# Patient Record
Sex: FEMALE | Race: WHITE | Employment: OTHER | URBAN - METROPOLITAN AREA
[De-identification: names, ages, dates, MRNs, and addresses within clinical notes are randomized per-mention and may not be internally consistent; named-entity substitution may affect disease eponyms.]

---

## 2017-01-26 ENCOUNTER — OFFICE VISIT (OUTPATIENT)
Dept: CARDIOLOGY CLINIC | Age: 82
End: 2017-01-26

## 2017-01-26 VITALS
DIASTOLIC BLOOD PRESSURE: 74 MMHG | BODY MASS INDEX: 19.51 KG/M2 | WEIGHT: 106 LBS | SYSTOLIC BLOOD PRESSURE: 118 MMHG | HEIGHT: 62 IN | HEART RATE: 56 BPM

## 2017-01-26 DIAGNOSIS — I35.1 AORTIC VALVE INSUFFICIENCY, UNSPECIFIED ETIOLOGY: ICD-10-CM

## 2017-01-26 DIAGNOSIS — R00.0 RAPID HEART BEAT: Primary | ICD-10-CM

## 2017-01-26 DIAGNOSIS — I10 ESSENTIAL HYPERTENSION, BENIGN: ICD-10-CM

## 2017-01-26 RX ORDER — LISINOPRIL 2.5 MG/1
2.5 TABLET ORAL DAILY
Qty: 90 TAB | Refills: 3 | Status: SHIPPED | OUTPATIENT
Start: 2017-01-26 | End: 2018-05-24 | Stop reason: SDUPTHER

## 2017-01-26 RX ORDER — TIMOLOL MALEATE 2.5 MG/ML
1 SOLUTION/ DROPS OPHTHALMIC 2 TIMES DAILY
COMMUNITY

## 2017-01-26 RX ORDER — LISINOPRIL 5 MG/1
TABLET ORAL DAILY
COMMUNITY
End: 2017-01-26 | Stop reason: SDUPTHER

## 2017-01-26 RX ORDER — CYCLOSPORINE 0.5 MG/ML
1 EMULSION OPHTHALMIC 2 TIMES DAILY
COMMUNITY

## 2017-01-26 RX ORDER — GLUCOSAMINE SULFATE 1500 MG
POWDER IN PACKET (EA) ORAL DAILY
COMMUNITY

## 2017-01-26 NOTE — PROGRESS NOTES
Crow Baird MD. [facility] [place]              Patient: Christiane Case  : 1935      Today's Date: 2017            HISTORY OF PRESENT ILLNESS:     History of Present Illness:  Ms. Obi Espinal comes in with complaint of heart racing spell. She says about two weeks ago she woke up with a heart racing spell - she usually sleeps poorly - may have lasted a few minutes - never happened before and not since then. No there heart racing spells. She saw her PCP and referred here. She gets lightheaded only after putting drops in eye in evening - but not other times. She denies any CP or SOB. PAST MEDICAL HISTORY:     Past Medical History   Diagnosis Date    Acne rosacea     AI (aortic insufficiency)      mild    Arthritis     Chronic Insomnia     Colon polyps     Glaucoma suspect     HTN (hypertension)     MR (mitral regurgitation)      mild    MIGUEL (obstructive sleep apnea)     Osteopenia     Other ill-defined conditions(799.89)      bone spurs lower back and neck    PSVT (paroxysmal supraventricular tachycardia) (HCC)     Stroke ([facility] [address]      tia    TIA (transient ischaemic attack)      amaurosis fugax 20+ years ago    Uterine fibroids          Past Surgical History   Procedure Laterality Date    Hx tonsil and adenoidectomy      Endoscopy, colon, diagnostic      Vas carotid duplex bilateral  3/26/12     0-9% bilateral    Hx orthopaedic       closed reduction right leg    Hx heent       tonsillectomy    Hx gyn       fibroids removed    Hx appendectomy             MEDICATIONS:     Current Outpatient Prescriptions   Medication Sig Dispense Refill    lisinopril (PRINIVIL, ZESTRIL) 5 mg tablet Take  by mouth daily.  cholecalciferol (VITAMIN D3) 1,000 unit cap Take  by mouth daily.  bimatoprost (LUMIGAN) 0.01 % ophthalmic drops Administer 1 Drop to both eyes every evening.       timolol (TIMOPTIC) 0.25 % ophthalmic solution Administer 1 Drop to both eyes two (2) times a day.      cycloSPORINE (RESTASIS) 0.05 % ophthalmic emulsion Administer 1 Drop to both eyes two (2) times a day.  LORazepam (ATIVAN) 1 mg tablet Take 1 mg by mouth nightly.  biotin 2,500 mcg tab Take  by mouth daily.  HYDROcodone-acetaminophen (NORCO) 5-325 mg per tablet Take 1 Tab by mouth every four (4) hours as needed for Pain. Max Daily Amount: 6 Tabs. Do not drive within 6 hours of taking 20 Tab 0    ondansetron (ZOFRAN ODT) 4 mg disintegrating tablet Take 1 Tab by mouth every eight (8) hours as needed for Nausea. 10 Tab 0    aspirin 81 mg chewable tablet Take 81 mg by mouth daily.  magnesium 250 mg Tab Take 125 mg by mouth nightly.  CALCIUM CARBONATE (CALCIUM 600 PO) Take  by mouth nightly.  MULTIVITAMIN PO Take  by mouth daily. No Known Allergies          SOCIAL HISTORY:     Social History   Substance Use Topics    Smoking status: Never Smoker    Smokeless tobacco: Never Used    Alcohol use 3.5 oz/week     7 Glasses of wine per week           FAMILY HISTORY:     Family History   Problem Relation Age of Onset    Heart Disease Father     Heart Disease Paternal Uncle            REVIEW OF SYMPTOMS:     Review of Symptoms:  Constitutional: Negative for fever, chills  HEENT: Negative for nosebleeds, tinnitus, and vision changes. Respiratory: Negative for cough, wheezing  Cardiovascular: Negative for orthopnea, claudication, syncope, and PND. Gastrointestinal: Negative for abdominal pain, diarrhea, melena. Genitourinary: Negative for dysuria  Musculoskeletal: Negative for myalgias. Skin: Negative for rash  Heme: No problems bleeding.  + bruises easily   Neurological: Negative for speech change and focal weakness. PHYSICAL EXAM:     Physical Exam:  Visit Vitals    /74    Pulse (!) 56    Ht 5' 2\" (1.575 m)    Wt 106 lb (48.1 kg)    BMI 19.39 kg/m2     Patient appears generally well, mood and affect are appropriate and pleasant.   HEENT: Hearing intact, non-icteric, normocephalic, atraumatic. Neck Exam: Supple, No JVD or carotid bruits. Lung Exam: Clear to auscultation, even breath sounds. Cardiac Exam: Regular rate and rhythm with 1/6 systolic murmur  Abdomen: Soft, non-tender, normal bowel sounds. No bruits or masses. Extremities: Moves all ext well. No lower extremity edema. Vascular: 2+ dorsalis pedis pulses bilaterally. Psych: Appropriate affect  Neuro - Grossly intact          LABS / OTHER STUDIES:     Lab Results   Component Value Date/Time    Sodium 139 06/14/2015 10:32 AM    Potassium 4.3 06/14/2015 10:32 AM    Chloride 103 06/14/2015 10:32 AM    CO2 28 06/14/2015 10:32 AM    Anion gap 8 06/14/2015 10:32 AM    Glucose 115 06/14/2015 10:32 AM    BUN 17 06/14/2015 10:32 AM    Creatinine 0.69 06/14/2015 10:32 AM    BUN/Creatinine ratio 25 06/14/2015 10:32 AM    GFR est AA >60 06/14/2015 10:32 AM    GFR est non-AA >60 06/14/2015 10:32 AM    Calcium 8.7 06/14/2015 10:32 AM    Bilirubin, total 0.5 04/29/2013 08:48 AM    ALT 9 04/29/2013 08:48 AM    AST 19 04/29/2013 08:48 AM    Alk.  phosphatase 60 04/29/2013 08:48 AM    Protein, total 6.4 04/29/2013 08:48 AM    Albumin 4.3 04/29/2013 08:48 AM    Globulin 2.9 10/04/2010 09:04 AM    A-G Ratio 2.0 04/29/2013 08:48 AM       Lab Results   Component Value Date/Time    Cholesterol, total 202 04/29/2013 08:48 AM    HDL Cholesterol 87 04/29/2013 08:48 AM    LDL, calculated 101 04/29/2013 08:48 AM    VLDL, calculated 14 04/29/2013 08:48 AM    Triglyceride 68 04/29/2013 08:48 AM    CHOL/HDL Ratio 2.3 10/04/2010 09:04 AM       Lab Results   Component Value Date/Time    WBC 6.6 06/14/2015 10:32 AM    Hemoglobin (POC) 13.3 08/16/2013 10:11 AM    HGB 13.1 06/14/2015 10:32 AM    Hematocrit (POC) 39 08/16/2013 10:11 AM    HCT 39.2 06/14/2015 10:32 AM    PLATELET 938 59/50/8085 10:32 AM    MCV 99.5 06/14/2015 10:32 AM               CARDIAC DIAGNOSTICS:     Cardiac Evaluation Includes:  Echo 2/12 - LVEF 55%, mild-mod AI  Carotid Doppler 3/12 - 0-9% stenosis bilat   Echo 9/13 - LVEF 55-60%. Mild AI. Holter 9/13 - NSR, few PAC and PVC's          EKG 1/26/17 - sinus bradycardia, PRWP            ASSESSMENT AND PLAN:     Assessment and Plan:  1) Heart racing spell  - she had a single heart racing spell one night  - I discussed options and we decided to check a 30 day event monitor if she has any further heart racing spells  - Will check an echo   - Will get some recent labs to review     2) Mild AI  - recheck an echo     3) HTN  - will cut back lisinopril due to dizziness     4) History of remote TIA  - cont ASA   - her prior lipids looked great (HDL 87)    5) Phone FU after testing. See me back in one year. Patient expressed understanding of the plan - questions were answered. Annabelle Sigala MD, [address], [address] [place] [address]  Ph: [phone]   Ph [phone]        ADDENDUM   2/6/2017  Echo 2/6/17 - LVEF 55-60%. Grade 1 diastolic dysfunction. Mild MR and TR.  AV sclerosis with mild-mod AI. Echo shows stable findings. Will have my nurse call with stable results.

## 2017-01-26 NOTE — MR AVS SNAPSHOT
Visit Information Date & Time Provider Department Dept. Phone Encounter #  
 1/26/2017  9:00 AM Efrain Alba MD [facility] Priti Jackson [phone] [id number] Your Appointments 2/6/2017 11:00 AM  
ECHO CARDIOGRAMS 2D with CONCETTA CORTEZ  
[facility] ([facility]) Appt Note: per dr Pranay Tabor dx ai  
 [address] [facility] [address] [facility] [address]  
[phone]  
  
   
 [address] [place] [id number]  
  
    
 1/30/2018 10:20 AM  
ESTABLISHED PATIENT with Efrain Alba MD  
[facility] ([facility]) Appt Note: annual  
 [address] [place] [address]  
[address] [facility] [place] [address] [address] Upcoming Health Maintenance Date Due ZOSTER VACCINE AGE 60> 7/23/1997 GLAUCOMA SCREENING Q2Y 7/23/2002 Pneumococcal 65+ Low/Medium Risk (1 of 2 - PCV13) 7/23/2002 MEDICARE YEARLY EXAM 7/23/2002 INFLUENZA AGE 9 TO ADULT 8/1/2016 DTaP/Tdap/Td series (2 - Td) 6/14/2025 Allergies as of 1/26/2017  Review Complete On: 1/26/2017 By: Efrain Alba MD  
 No Known Allergies Current Immunizations  Never Reviewed Name Date Tdap 6/14/2015 10:49 AM  
  
 Not reviewed this visit You Were Diagnosed With   
  
 Codes Comments Rapid heart beat    -  Primary ICD-10-CM: R00.0 ICD-9-CM: 894. 0 Vitals BP Pulse Height(growth percentile) Weight(growth percentile) BMI OB Status 118/74 (!) 56 5' 2\" (1.575 m) 106 lb (48.1 kg) 19.39 kg/m2 Postmenopausal  
 Smoking Status Never Smoker Vitals History BMI and BSA Data Body Mass Index Body Surface Area  
 19.39 kg/m 2 1.45 m 2 Preferred Pharmacy Pharmacy Name Phone [place] [address] [address] [facility] [facility] [phone] Your Updated Medication List  
  
   
 This list is accurate as of: 1/26/17  9:57 AM.  Always use your most recent med list.  
  
  
  
  
 aspirin 81 mg chewable tablet Take 81 mg by mouth daily. biotin 2,500 mcg Tab Take  by mouth daily. CALCIUM 600 PO Take  by mouth nightly. HYDROcodone-acetaminophen 5-325 mg per tablet Commonly known as:  Tarsha Flores Take 1 Tab by mouth every four (4) hours as needed for Pain. Max Daily Amount: 6 Tabs. Do not drive within 6 hours of taking  
  
 lisinopril 2.5 mg tablet Commonly known as:  Yao Pares Take 1 Tab by mouth daily. LORazepam 1 mg tablet Commonly known as:  ATIVAN Take 1 mg by mouth nightly. LUMIGAN 0.01 % ophthalmic drops Generic drug:  bimatoprost  
Administer 1 Drop to both eyes every evening.  
  
 magnesium 250 mg Tab Take 125 mg by mouth nightly. MULTIVITAMIN PO Take  by mouth daily. ondansetron 4 mg disintegrating tablet Commonly known as:  ZOFRAN ODT Take 1 Tab by mouth every eight (8) hours as needed for Nausea. RESTASIS 0.05 % ophthalmic emulsion Generic drug:  cycloSPORINE Administer 1 Drop to both eyes two (2) times a day. timolol 0.25 % ophthalmic solution Commonly known as:  TIMOPTIC Administer 1 Drop to both eyes two (2) times a day. VITAMIN D3 1,000 unit Cap Generic drug:  cholecalciferol Take  by mouth daily. Prescriptions Printed Refills  
 lisinopril (PRINIVIL, ZESTRIL) 2.5 mg tablet 3 Sig: Take 1 Tab by mouth daily. Class: Print Route: Oral  
  
We Performed the Following AMB POC EKG ROUTINE W/ 12 LEADS, INTER & REP [36540 CPT(R)] Introducing [facility]! Dear Thai Levy: 
Thank you for requesting a Flashnotes account. Our records indicate that you already have an active Flashnotes account. You can access your account anytime at [email or web address] Georgina Goodman/[employer] Did you know that you can access your hospital and ER discharge instructions at any time in [employer]? You can also review all of your test results from your hospital stay or ER visit. Additional Information If you have questions, please visit the Frequently Asked Questions section of the [employer] website at [email or web address] Remember, [employer] is NOT to be used for urgent needs. For medical emergencies, dial 911. Now available from your iPhone and Android! Please provide this summary of care documentation to your next provider. Your primary care clinician is listed as Cardwell Noreen. If you have any questions after today's visit, please call [phone].

## 2017-02-06 ENCOUNTER — CLINICAL SUPPORT (OUTPATIENT)
Dept: CARDIOLOGY CLINIC | Age: 82
End: 2017-02-06

## 2017-02-06 DIAGNOSIS — I35.1 AORTIC VALVE INSUFFICIENCY, UNSPECIFIED ETIOLOGY: Primary | ICD-10-CM

## 2017-02-09 ENCOUNTER — TELEPHONE (OUTPATIENT)
Dept: CARDIOLOGY CLINIC | Age: 82
End: 2017-02-09

## 2017-02-09 NOTE — TELEPHONE ENCOUNTER
----- Message from Raz Iniguez MD sent at 2/6/2017 11:45 PM EST -----  Regarding: please call patient   Bertin Lipscomb - please call patient. Echo 2/6/17 - LVEF 55-60%. Grade 1 diastolic dysfunction. Mild MR and TR.  AV sclerosis with mild-mod AI. Echo shows stable findings. Will have my nurse call with stable results.        Thanks,  SK

## 2018-01-30 ENCOUNTER — OFFICE VISIT (OUTPATIENT)
Dept: CARDIOLOGY CLINIC | Age: 83
End: 2018-01-30

## 2018-01-30 VITALS
HEART RATE: 60 BPM | DIASTOLIC BLOOD PRESSURE: 90 MMHG | OXYGEN SATURATION: 99 % | BODY MASS INDEX: 18.07 KG/M2 | WEIGHT: 98.8 LBS | SYSTOLIC BLOOD PRESSURE: 150 MMHG

## 2018-01-30 DIAGNOSIS — I35.1 AORTIC VALVE INSUFFICIENCY, ETIOLOGY OF CARDIAC VALVE DISEASE UNSPECIFIED: ICD-10-CM

## 2018-01-30 DIAGNOSIS — I10 ESSENTIAL HYPERTENSION, BENIGN: Primary | ICD-10-CM

## 2018-01-30 RX ORDER — ZOLPIDEM TARTRATE 10 MG/1
10 TABLET ORAL
COMMUNITY

## 2018-01-30 NOTE — PROGRESS NOTES
Emma Tello MD. [facility] [place]              Patient: Rick Decker  : 1935      Today's Date: 2018            HISTORY OF PRESENT ILLNESS:     History of Present Illness:  Ms. Rhianna Lerma is here for follow-up. She can't gain weight. No abd pain. No problems swallowing. Bruises easily. A little lightheaded sometimes. No CP or SOB. PAST MEDICAL HISTORY:     Past Medical History:   Diagnosis Date    Acne rosacea     AI (aortic insufficiency)     mild    Arthritis     Chronic Insomnia     Colon polyps     Glaucoma suspect     HTN (hypertension)     MR (mitral regurgitation)     mild    MIGUEL (obstructive sleep apnea)     Osteopenia     Other ill-defined conditions(799.89)     bone spurs lower back and neck    PSVT (paroxysmal supraventricular tachycardia) (HCC)     Stroke ([facility] [address]     tia    TIA (transient ischaemic attack)     amaurosis fugax 20+ years ago    Uterine fibroids            Past Surgical History:   Procedure Laterality Date    ENDOSCOPY, COLON, DIAGNOSTIC      HX APPENDECTOMY      HX GYN      fibroids removed    HX HEENT      tonsillectomy    HX ORTHOPAEDIC      closed reduction right leg    HX TONSIL AND ADENOIDECTOMY      VAS CAROTID DUPLEX BILATERAL  3/26/12    0-9% bilateral           MEDICATIONS:     Current Outpatient Prescriptions   Medication Sig Dispense Refill    zolpidem (AMBIEN) 10 mg tablet Take  by mouth nightly as needed for Sleep.  cholecalciferol (VITAMIN D3) 1,000 unit cap Take  by mouth daily.  bimatoprost (LUMIGAN) 0.01 % ophthalmic drops Administer 1 Drop to both eyes every evening.  timolol (TIMOPTIC) 0.25 % ophthalmic solution Administer 1 Drop to both eyes two (2) times a day.  cycloSPORINE (RESTASIS) 0.05 % ophthalmic emulsion Administer 1 Drop to both eyes two (2) times a day.  lisinopril (PRINIVIL, ZESTRIL) 2.5 mg tablet Take 1 Tab by mouth daily.  (Patient taking differently: Take 2.5 mg by mouth daily. Indications: 0.5 tab) 90 Tab 3    biotin 2,500 mcg tab Take  by mouth daily.  aspirin 81 mg chewable tablet Take 81 mg by mouth daily. Indications: 0.5 tab      magnesium 250 mg Tab Take 125 mg by mouth nightly.  CALCIUM CARBONATE (CALCIUM 600 PO) Take  by mouth nightly.  MULTIVITAMIN PO Take  by mouth daily.  LORazepam (ATIVAN) 1 mg tablet Take 1 mg by mouth nightly.  HYDROcodone-acetaminophen (NORCO) 5-325 mg per tablet Take 1 Tab by mouth every four (4) hours as needed for Pain. Max Daily Amount: 6 Tabs. Do not drive within 6 hours of taking 20 Tab 0    ondansetron (ZOFRAN ODT) 4 mg disintegrating tablet Take 1 Tab by mouth every eight (8) hours as needed for Nausea. 10 Tab 0       No Known Allergies          SOCIAL HISTORY:     Social History   Substance Use Topics    Smoking status: Never Smoker    Smokeless tobacco: Never Used    Alcohol use 3.5 oz/week     7 Glasses of wine per week           FAMILY HISTORY:     Family History   Problem Relation Age of Onset    Heart Disease Father     Heart Disease Paternal Uncle             REVIEW OF SYMPTOMS:      Review of Symptoms:  Constitutional: Negative for fever, chills  HEENT: Negative for nosebleeds, tinnitus, and vision changes. Respiratory: Negative for cough, wheezing  Cardiovascular: Negative for orthopnea, claudication, syncope, and PND. Gastrointestinal: Negative for abdominal pain, diarrhea, melena. Genitourinary: Negative for dysuria  Musculoskeletal: Negative for myalgias.    Skin: Negative for rash  Heme: No problems bleeding.  + bruises easily   Neurological: Negative for speech change and focal weakness.                  PHYSICAL EXAM:      Physical Exam:  Visit Vitals    /90 (BP 1 Location: Left arm, BP Patient Position: Sitting)    Pulse 60    Wt 98 lb 12.8 oz (44.8 kg)    SpO2 99%    BMI 18.07 kg/m2       Patient appears generally well, mood and affect are appropriate and pleasant. HEENT:  Hearing intact, non-icteric, normocephalic, atraumatic. Neck Exam: Supple, No JVD or carotid bruits. Lung Exam: Clear to auscultation, even breath sounds. Cardiac Exam: Regular rate and rhythm with 1/6 systolic murmur  Abdomen: Soft, non-tender, normal bowel sounds. No bruits or masses. Extremities: Moves all ext well. No lower extremity edema. Vascular: 2+ dorsalis pedis pulses bilaterally. Psych: Appropriate affect  Neuro - Grossly intact              LABS / OTHER STUDIES:      Lab Results   Component Value Date/Time    Sodium 139 06/14/2015 10:32 AM    Potassium 4.3 06/14/2015 10:32 AM    Chloride 103 06/14/2015 10:32 AM    CO2 28 06/14/2015 10:32 AM    Anion gap 8 06/14/2015 10:32 AM    Glucose 115 06/14/2015 10:32 AM    BUN 17 06/14/2015 10:32 AM    Creatinine 0.69 06/14/2015 10:32 AM    BUN/Creatinine ratio 25 06/14/2015 10:32 AM    GFR est AA >60 06/14/2015 10:32 AM    GFR est non-AA >60 06/14/2015 10:32 AM    Calcium 8.7 06/14/2015 10:32 AM    Bilirubin, total 0.5 04/29/2013 08:48 AM    AST (SGOT) 19 04/29/2013 08:48 AM    Alk. phosphatase 60 04/29/2013 08:48 AM    Protein, total 6.4 04/29/2013 08:48 AM    Albumin 4.3 04/29/2013 08:48 AM    Globulin 2.9 10/04/2010 09:04 AM    A-G Ratio 2.0 04/29/2013 08:48 AM    ALT (SGPT) 9 04/29/2013 08:48 AM       Lab Results   Component Value Date/Time    WBC 6.6 06/14/2015 10:32 AM    Hemoglobin (POC) 13.3 08/16/2013 10:11 AM    HGB 13.1 06/14/2015 10:32 AM    Hematocrit (POC) 39 08/16/2013 10:11 AM    HCT 39.2 06/14/2015 10:32 AM    PLATELET 868 80/03/7839 10:32 AM    MCV 99.5 06/14/2015 10:32 AM                   CARDIAC DIAGNOSTICS:      Cardiac Evaluation Includes:  Echo 2/12 - LVEF 55%, mild-mod AI  Carotid Doppler 3/12 - 0-9% stenosis bilat   Echo 9/13 - LVEF 55-60%. Mild AI. Holter 9/13 - NSR, few PAC and PVC's  Echo 2/6/17 - LVEF 55-60%. Grade 1 diastolic dysfunction.   Mild MR and TR.  AV sclerosis with mild-mod AI.           EKG 1/26/17 - sinus bradycardia, PRWP  EKG 1/30/18 - sinus bradycardia, PRWP, non-specific T wave abn               ASSESSMENT AND PLAN:      Assessment and Plan:  1) Heart racing spell  - infrequent heart racing - not a big deal to her - brief   - I discussed options and we decided to check a 30 day event monitor later if she wants - she declines at this time (can do it if she feels wrose)      2) Mild AI  - Echo 2/6/17 - LVEF 55-60%. Grade 1 diastolic dysfunction. Mild MR and TR.  AV sclerosis with mild-mod AI. - recheck an echo next year      3) HTN  - she is taking a tiny dose of lisinopril only   - BP high in the office -- Asked her to follow BP at home (Goal < 135/85 at home)     4) History of remote TIA  - cont ASA   - her prior lipids looked great (HDL 87)     5) See me back in one year. Patient expressed understanding of the plan - questions were answered.         Branda Hodgkin, MD, [address]  [address] [place] [address], [address] [place] [address].  [address], [address] [place] [address]  Ph: [phone]                                                             Ph [phone]

## 2018-05-24 ENCOUNTER — OFFICE VISIT (OUTPATIENT)
Dept: CARDIOLOGY CLINIC | Age: 83
End: 2018-05-24

## 2018-05-24 VITALS
OXYGEN SATURATION: 97 % | WEIGHT: 99.8 LBS | DIASTOLIC BLOOD PRESSURE: 80 MMHG | SYSTOLIC BLOOD PRESSURE: 140 MMHG | HEART RATE: 62 BPM | HEIGHT: 62 IN | BODY MASS INDEX: 18.37 KG/M2

## 2018-05-24 DIAGNOSIS — I35.1 AORTIC VALVE INSUFFICIENCY, ETIOLOGY OF CARDIAC VALVE DISEASE UNSPECIFIED: ICD-10-CM

## 2018-05-24 DIAGNOSIS — I10 ESSENTIAL HYPERTENSION, BENIGN: Primary | ICD-10-CM

## 2018-05-24 DIAGNOSIS — R00.0 RAPID HEART BEAT: ICD-10-CM

## 2018-05-24 RX ORDER — LISINOPRIL 5 MG/1
5 TABLET ORAL DAILY
Qty: 30 TAB | Refills: 0
Start: 2018-05-24 | End: 2019-02-05 | Stop reason: ALTCHOICE

## 2018-05-24 NOTE — PROGRESS NOTES
Jodi Aquino MD. [facility]              Patient: Itzel Richard  : 1935      Today's Date: 2018            HISTORY OF PRESENT ILLNESS:     History of Present Illness:  She is here since BP is up and down. SBP up to 160's sometimes - but can also be 102 sometimes. She also has difficulty sleeping. Also hard time gaining weight - does not eat much. She says Dr. Diego Reynolds asked her to see us for an echo. PAST MEDICAL HISTORY:     Past Medical History:   Diagnosis Date    Acne rosacea     AI (aortic insufficiency)     mild    Arthritis     Chronic Insomnia     Colon polyps     Glaucoma suspect     HTN (hypertension)     MR (mitral regurgitation)     mild    MIGUEL (obstructive sleep apnea)     Osteopenia     Other ill-defined conditions(799.89)     bone spurs lower back and neck    PSVT (paroxysmal supraventricular tachycardia) (HCC)     Stroke ([facility] [address]     tia    TIA (transient ischaemic attack)     amaurosis fugax 20+ years ago    Uterine fibroids          Past Surgical History:   Procedure Laterality Date    ENDOSCOPY, COLON, DIAGNOSTIC      HX APPENDECTOMY      HX GYN      fibroids removed    HX HEENT      tonsillectomy    HX ORTHOPAEDIC      closed reduction right leg    HX TONSIL AND ADENOIDECTOMY      VAS CAROTID DUPLEX BILATERAL  3/26/12    0-9% bilateral           MEDICATIONS:     Current Outpatient Prescriptions   Medication Sig Dispense Refill    lisinopril (PRINIVIL, ZESTRIL) 5 mg tablet Take 1 Tab by mouth daily. Indications: 0.5 tab 30 Tab 0    zolpidem (AMBIEN) 10 mg tablet Take  by mouth nightly as needed for Sleep.  cholecalciferol (VITAMIN D3) 1,000 unit cap Take  by mouth daily.  bimatoprost (LUMIGAN) 0.01 % ophthalmic drops Administer 1 Drop to both eyes every evening.  timolol (TIMOPTIC) 0.25 % ophthalmic solution Administer 1 Drop to both eyes two (2) times a day.       cycloSPORINE (RESTASIS) 0.05 % ophthalmic emulsion Administer 1 Drop to both eyes two (2) times a day.  biotin 2,500 mcg tab Take  by mouth daily.  aspirin 81 mg chewable tablet Take 81 mg by mouth daily. Indications: 0.5 tab      magnesium 250 mg Tab Take 125 mg by mouth every other day.  CALCIUM CARBONATE (CALCIUM 600 PO) Take  by mouth nightly.  MULTIVITAMIN PO Take  by mouth daily. No Known Allergies          SOCIAL HISTORY:     Social History   Substance Use Topics    Smoking status: Never Smoker    Smokeless tobacco: Never Used    Alcohol use 3.5 oz/week     7 Glasses of wine per week         FAMILY HISTORY:     Family History   Problem Relation Age of Onset    Heart Disease Father     Heart Disease Paternal Uncle             REVIEW OF SYMPTOMS:       Review of Symptoms:  Constitutional: Negative for fever, chills  HEENT: Negative for nosebleeds, tinnitus, and vision changes. Respiratory: Negative for cough, wheezing  Cardiovascular: Negative for orthopnea, claudication, syncope, and PND. Gastrointestinal: Negative for abdominal pain, diarrhea, melena. Genitourinary: Negative for dysuria  Musculoskeletal: Negative for myalgias. Skin: Negative for rash  Heme: No problems bleeding.  + bruises easily   Neurological: Negative for speech change and focal weakness.                       PHYSICAL EXAM:       Physical Exam:  Visit Vitals    /80 (BP 1 Location: Left arm, BP Patient Position: Sitting)    Pulse 62    Ht 5' 2\" (1.575 m)    Wt 99 lb 12.8 oz (45.3 kg)    SpO2 97%    BMI 18.25 kg/m2       Patient appears generally well, mood and affect are appropriate and pleasant. HEENT:  Hearing intact, non-icteric, normocephalic, atraumatic. Neck Exam: Supple, No JVD  Lung Exam: Clear to auscultation, even breath sounds. Cardiac Exam: Regular rate and rhythm with 1/6 systolic murmur  Abdomen: Soft, non-tender  Extremities: Moves all ext well. No lower extremity edema.   Psych: Appropriate affect  Neuro - Grossly intact                  LABS / OTHER STUDIES:         Lab Results   Component Value Date/Time    Sodium 139 06/14/2015 10:32 AM    Potassium 4.3 06/14/2015 10:32 AM    Chloride 103 06/14/2015 10:32 AM    CO2 28 06/14/2015 10:32 AM    Anion gap 8 06/14/2015 10:32 AM    Glucose 115 (H) 06/14/2015 10:32 AM    BUN 17 06/14/2015 10:32 AM    Creatinine 0.69 06/14/2015 10:32 AM    BUN/Creatinine ratio 25 (H) 06/14/2015 10:32 AM    GFR est AA >60 06/14/2015 10:32 AM    GFR est non-AA >60 06/14/2015 10:32 AM    Calcium 8.7 06/14/2015 10:32 AM    Bilirubin, total 0.5 04/29/2013 08:48 AM    AST (SGOT) 19 04/29/2013 08:48 AM    Alk. phosphatase 60 04/29/2013 08:48 AM    Protein, total 6.4 04/29/2013 08:48 AM    Albumin 4.3 04/29/2013 08:48 AM    Globulin 2.9 10/04/2010 09:04 AM    A-G Ratio 2.0 04/29/2013 08:48 AM    ALT (SGPT) 9 04/29/2013 08:48 AM       Lab Results   Component Value Date/Time    WBC 6.6 06/14/2015 10:32 AM    Hemoglobin (POC) 13.3 08/16/2013 10:11 AM    HGB 13.1 06/14/2015 10:32 AM    Hematocrit (POC) 39 08/16/2013 10:11 AM    HCT 39.2 06/14/2015 10:32 AM    PLATELET 744 (L) 09/92/4722 10:32 AM    MCV 99.5 (H) 06/14/2015 10:32 AM     Labs 5/18 - A1c 5.5, TSH 0.78, CBC OK, chol 208, , HDL 86, LDL 94, CMP OK                  CARDIAC DIAGNOSTICS:       Cardiac Evaluation Includes:  Echo 2/12 - LVEF 55%, mild-mod AI  Carotid Doppler 3/12 - 0-9% stenosis bilat   Echo 9/13 - LVEF 55-60%. [address] AI. Holter 9/13 - NSR, few PAC and PVC's  Echo 2/6/17 - LVEF 55-60%.   Grade 1 diastolic dysfunction.  Mild MR and TR.  AV sclerosis with mild-mod AI.             EKG 1/26/17 - sinus bradycardia, PRWP  EKG 1/30/18 - sinus bradycardia, PRWP, non-specific T wave abn                   ASSESSMENT AND PLAN:       Assessment and Plan:  1) Heart racing spell  - infrequent heart racing - not a big deal to her - brief   - I discussed options and we decided to check a 30 day event monitor later if she wants - she declines at this time (can do it if she feels wrose)       2) Mild AI  - Echo 2/6/17 - LVEF 55-60%.  Grade 1 diastolic dysfunction.  Mild MR and TR.  AV sclerosis with mild-mod AI. - recheck an echo       3) HTN  - she is taking a small dose of lisinopril only   - BP up and down  - I asked her to keep a log of home BP's and then send me numbers in a couple of weeks       4) History of remote TIA  - cont ASA   - her prior lipids looked great (HDL 87)      5) See me back in one month with an echo.  Patient expressed understanding of the plan - questions were answered.          Sharan Severs, MD, [address], [address] [id number]  Suite [facility] [address], [address] [place] [address], [address] [place] [address]  Ph: [phone]                                                              [phone]  Basilia Hernandez

## 2018-06-29 ENCOUNTER — CLINICAL SUPPORT (OUTPATIENT)
Dept: CARDIOLOGY CLINIC | Age: 83
End: 2018-06-29

## 2018-06-29 ENCOUNTER — OFFICE VISIT (OUTPATIENT)
Dept: CARDIOLOGY CLINIC | Age: 83
End: 2018-06-29

## 2018-06-29 VITALS
RESPIRATION RATE: 16 BRPM | WEIGHT: 99 LBS | BODY MASS INDEX: 18.22 KG/M2 | SYSTOLIC BLOOD PRESSURE: 146 MMHG | HEART RATE: 60 BPM | HEIGHT: 62 IN | DIASTOLIC BLOOD PRESSURE: 84 MMHG

## 2018-06-29 DIAGNOSIS — I35.1 AORTIC VALVE INSUFFICIENCY, ETIOLOGY OF CARDIAC VALVE DISEASE UNSPECIFIED: Primary | ICD-10-CM

## 2018-06-29 DIAGNOSIS — I10 ESSENTIAL HYPERTENSION, BENIGN: ICD-10-CM

## 2018-06-29 DIAGNOSIS — I35.1 AORTIC VALVE INSUFFICIENCY, ETIOLOGY OF CARDIAC VALVE DISEASE UNSPECIFIED: ICD-10-CM

## 2018-06-29 DIAGNOSIS — R00.0 RAPID HEART BEAT: Primary | ICD-10-CM

## 2018-06-29 NOTE — MR AVS SNAPSHOT
[address] [facility] [address] [place] [address] 
[phone] Patient: Maile Broderick MRN:  [id number] Visit Information Date & Time Provider Department Dept. Phone Encounter #  
 6/29/2018 11:40 AM Cassy Reyes MD [facility] Kerry Lui [phone] [id number] Your Appointments 1/4/2019 10:40 AM  
ESTABLISHED PATIENT with Cassy Reyes MD  
[facility] Appt Note: 6 mo fup  
 [address] [facility] Jamie 600 [facility] [address]  
[phone]  
  
   
 320 [facility] [address] [address] [place]  
  
    
 2/5/2019 10:40 AM  
ESTABLISHED PATIENT with Cassy Reyes MD  
[facility] ([facility] [address] Appt Note: echo a 10am per dr Jack Rodriguez dx   ov at 1225 [facility] [address] [place] [address]  
[phone] Upcoming Health Maintenance Date Due ZOSTER VACCINE AGE 60> 5/23/1997 GLAUCOMA SCREENING Q2Y 7/23/2002 Pneumococcal 65+ Low/Medium Risk (1 of 2 - PCV13) 7/23/2002 MEDICARE YEARLY EXAM 3/14/2018 Influenza Age 5 to Adult 8/1/2018 DTaP/Tdap/Td series (2 - Td) 6/14/2025 Allergies as of 6/29/2018  Review Complete On: 6/29/2018 By: Cassy Reyes MD  
 No Known Allergies Current Immunizations  Never Reviewed Name Date Tdap 6/14/2015 10:49 AM  
  
 Not reviewed this visit You Were Diagnosed With   
  
 Codes Comments Aortic valve insufficiency, etiology of cardiac valve disease unspecified    -  Primary ICD-10-CM: I35.1 ICD-9-CM: 424.1 Essential hypertension, benign     ICD-10-CM: I10 
ICD-9-CM: 401.1 Vitals BP Pulse Resp Height(growth percentile) Weight(growth percentile) BMI  
 146/84 (BP 1 Location: Left arm, BP Patient Position: Sitting) 60 16 5' 2\" (1.575 m) 99 lb (44.9 kg) 18.11 kg/m2 OB Status Smoking Status Postmenopausal Never Smoker Vitals History BMI and BSA Data Body Mass Index Body Surface Area  
 18.11 kg/m 2 1.4 m 2 Preferred Pharmacy Pharmacy Name Phone [facility] [address] [phone] Your Updated Medication List  
  
   
This list is accurate as of 6/29/18 11:55 AM.  Always use your most recent med list.  
  
  
  
  
 AMBIEN 10 mg tablet Generic drug:  zolpidem Take  by mouth nightly as needed for Sleep. aspirin 81 mg chewable tablet Take 81 mg by mouth daily. Indications: 0.5 tab  
  
 biotin 2,500 mcg Tab Take  by mouth daily. CALCIUM 600 PO Take  by mouth nightly. lisinopril 5 mg tablet Commonly known as:  Valene Pencil Take 1 Tab by mouth daily. Indications: 0.5 tab LUMIGAN 0.01 % ophthalmic drops Generic drug:  bimatoprost  
Administer 1 Drop to both eyes every evening.  
  
 magnesium 250 mg Tab Take 125 mg by mouth every other day. MULTIVITAMIN PO Take  by mouth daily. RESTASIS 0.05 % ophthalmic emulsion Generic drug:  cycloSPORINE Administer 1 Drop to both eyes two (2) times a day. timolol 0.25 % ophthalmic solution Commonly known as:  TIMOPTIC Administer 1 Drop to both eyes two (2) times a day. VITAMIN D3 1,000 unit Cap Generic drug:  cholecalciferol Take  by mouth daily. Introducing [facility]! Dear Juan Moses: 
Thank you for requesting a VouchedFor account. Our records indicate that you already have an active VouchedFor account. You can access your account anytime at [email or web address] Did you know that you can access your hospital and ER discharge instructions at any time in VouchedFor? You can also review all of your test results from your hospital stay or ER visit. Additional Information If you have questions, please visit the Frequently Asked Questions section of the Evoleen website at [email or web address] Remember, Evoleen is NOT to be used for urgent needs. For medical emergencies, dial 911. Now available from your iPhone and Android! Please provide this summary of care documentation to your next provider. Your primary care clinician is listed as Floresita Jacob. If you have any questions after today's visit, please call [phone].

## 2018-06-29 NOTE — PROGRESS NOTES
Nikolas Ivey MD. [facility] [place]              Patient: Meme Johnson  : 1935      Today's Date: 2018          HISTORY OF PRESENT ILLNESS:     History of Present Illness:  Here for follow-up. Sleeps just 4 hours a night. BP looks good past few days. Feels palpitations - once a day for a couple of seconds. No SOB. No dizziness. PAST MEDICAL HISTORY:     Past Medical History:   Diagnosis Date    Acne rosacea     AI (aortic insufficiency)     mild    Arthritis     Chronic Insomnia     Colon polyps     Glaucoma suspect     HTN (hypertension)     MR (mitral regurgitation)     mild    MIGUEL (obstructive sleep apnea)     Osteopenia     Other ill-defined conditions(799.89)     bone spurs lower back and neck    PSVT (paroxysmal supraventricular tachycardia) (HCC)     Stroke ([facility] [address]     tia    TIA (transient ischaemic attack)     amaurosis fugax 20+ years ago    Uterine fibroids          Past Surgical History:   Procedure Laterality Date    ENDOSCOPY, COLON, DIAGNOSTIC      HX APPENDECTOMY      HX GYN      fibroids removed    HX HEENT      tonsillectomy    HX ORTHOPAEDIC      closed reduction right leg    HX TONSIL AND ADENOIDECTOMY      VAS CAROTID DUPLEX BILATERAL  3/26/12    0-9% bilateral           MEDICATIONS:     Current Outpatient Prescriptions   Medication Sig Dispense Refill    lisinopril (PRINIVIL, ZESTRIL) 5 mg tablet Take 1 Tab by mouth daily. Indications: 0.5 tab 30 Tab 0    zolpidem (AMBIEN) 10 mg tablet Take  by mouth nightly as needed for Sleep.  cholecalciferol (VITAMIN D3) 1,000 unit cap Take  by mouth daily.  bimatoprost (LUMIGAN) 0.01 % ophthalmic drops Administer 1 Drop to both eyes every evening.  timolol (TIMOPTIC) 0.25 % ophthalmic solution Administer 1 Drop to both eyes two (2) times a day.  cycloSPORINE (RESTASIS) 0.05 % ophthalmic emulsion Administer 1 Drop to both eyes two (2) times a day.       biotin 2,500 mcg tab Take  by mouth daily.  aspirin 81 mg chewable tablet Take 81 mg by mouth daily. Indications: 0.5 tab      magnesium 250 mg Tab Take 125 mg by mouth every other day.  CALCIUM CARBONATE (CALCIUM 600 PO) Take  by mouth nightly.  MULTIVITAMIN PO Take  by mouth daily. No Known Allergies          SOCIAL HISTORY:     Social History   Substance Use Topics    Smoking status: Never Smoker    Smokeless tobacco: Never Used    Alcohol use 3.5 oz/week     7 Glasses of wine per week      Comment: occasional         FAMILY HISTORY:     Family History   Problem Relation Age of Onset    Heart Disease Father     Heart Disease Paternal Uncle           REVIEW OF SYMPTOMS:       Review of Symptoms:  Constitutional: Negative for fever, chills  HEENT: Negative for nosebleeds, tinnitus, and vision changes. Respiratory: Negative for cough, wheezing  Cardiovascular: Negative for orthopnea, claudication, syncope, and PND. Gastrointestinal: Negative for abdominal pain, diarrhea, melena. Genitourinary: Negative for dysuria  Musculoskeletal: Negative for myalgias. Skin: Negative for rash  Heme: No problems bleeding.  + bruises easily   Neurological: Negative for speech change and focal weakness.                       PHYSICAL EXAM:       Physical Exam:  Visit Vitals    /84 (BP 1 Location: Left arm, BP Patient Position: Sitting)    Resp 16    Ht 5' 2\" (1.575 m)    Wt 99 lb (44.9 kg)    BMI 18.11 kg/m2       Patient appears generally well, mood and affect are appropriate and pleasant. HEENT:  Hearing intact, non-icteric, normocephalic, atraumatic. Neck Exam: Supple, No JVD  Lung Exam: Clear to auscultation, even breath sounds. Cardiac Exam: Regular rate and rhythm with 2/6 systolic murmur  Abdomen: Soft, non-tender  Extremities: Moves all ext well. No lower extremity edema.   Psych: Appropriate affect  Neuro - Grossly intact                  LABS / OTHER STUDIES:             Labs 5/18 - A1c 5.5, TSH 0.78, CBC OK, chol 208, , HDL 86, LDL 94, CMP OK                  CARDIAC DIAGNOSTICS:       Cardiac Evaluation Includes:  Echo 2/12 - LVEF 55%, mild-mod AI  Carotid Doppler 3/12 - 0-9% stenosis bilat   Echo 9/13 - LVEF 55-60%. [address] AI. Holter 9/13 - NSR, few PAC and PVC's  Echo 2/6/17 - LVEF 55-60%.  Grade 1 diastolic dysfunction.  Mild MR and TR.  AV sclerosis with mild-mod AI.       Echo 6/29/18 - LVEF 60 %. RV normal.  Mild MR. Mild AI. Mod TR.         EKG 1/26/17 - sinus bradycardia, PRWP  EKG 1/30/18 - sinus bradycardia, PRWP, non-specific T wave abn                   ASSESSMENT AND PLAN:       Assessment and Plan:  1) Heart racing spell / palpitations   - infrequent heart racing - not a big deal to her - brief   - I discussed options and we decided to check a 30 day event monitor later if she wants - she declines at this time (can do it if she feels wrose)       2) Mild AI  - Echo 2/6/17 - LVEF 55-60%.  Grade 1 diastolic dysfunction.  Mild MR and TR.  AV sclerosis with mild-mod AI. - echo stable 6/18   - recheck an echo every 1-2 years       3) HTN  - she is taking a small dose of lisinopril only   - BP up and down but mostly OK at home       4) History of remote TIA  - cont ASA   - her prior lipids looked great (HDL 87)      5) See me back in 6 months. Patient expressed understanding of the plan - questions were answered.      I see her . She is in a walking group and walks regularly.  Josh Wells MD, [address] [facility]  [address] [place] [address] [address], [address]                [id number].  [address], [address] [place] [address], [address] [place] [address]  Ph: [phone]                                                              [phone]

## 2019-02-05 ENCOUNTER — CLINICAL SUPPORT (OUTPATIENT)
Dept: CARDIOLOGY CLINIC | Age: 84
End: 2019-02-05

## 2019-02-05 ENCOUNTER — OFFICE VISIT (OUTPATIENT)
Dept: CARDIOLOGY CLINIC | Age: 84
End: 2019-02-05

## 2019-02-05 VITALS
WEIGHT: 101 LBS | DIASTOLIC BLOOD PRESSURE: 80 MMHG | BODY MASS INDEX: 18.58 KG/M2 | SYSTOLIC BLOOD PRESSURE: 122 MMHG | HEIGHT: 62 IN

## 2019-02-05 VITALS
HEART RATE: 60 BPM | BODY MASS INDEX: 18.58 KG/M2 | SYSTOLIC BLOOD PRESSURE: 122 MMHG | HEIGHT: 62 IN | DIASTOLIC BLOOD PRESSURE: 80 MMHG | WEIGHT: 101 LBS

## 2019-02-05 DIAGNOSIS — R00.2 PALPITATIONS: Primary | ICD-10-CM

## 2019-02-05 DIAGNOSIS — I35.1 AORTIC VALVE INSUFFICIENCY, ETIOLOGY OF CARDIAC VALVE DISEASE UNSPECIFIED: ICD-10-CM

## 2019-02-05 DIAGNOSIS — I35.1 NONRHEUMATIC AORTIC VALVE INSUFFICIENCY: ICD-10-CM

## 2019-02-05 DIAGNOSIS — I10 ESSENTIAL HYPERTENSION, BENIGN: ICD-10-CM

## 2019-02-05 DIAGNOSIS — R00.2 PALPITATIONS: ICD-10-CM

## 2019-02-05 DIAGNOSIS — R00.0 RAPID HEART BEAT: Primary | ICD-10-CM

## 2019-02-05 RX ORDER — LISINOPRIL 5 MG/1
2.5 TABLET ORAL DAILY
COMMUNITY

## 2019-02-05 NOTE — PROGRESS NOTES
Tremaine Galo MD. [facility]              Patient: Best Beal  : 1935      Today's Date: 2019            HISTORY OF PRESENT ILLNESS:     History of Present Illness:  Here for follow-up. She is feeling OK overall. She feels irregular heart beats that can last 15 min at a time - feels Ok during it - happens it once a day or so. No CP. Walks a mile a day on the treadmill. PAST MEDICAL HISTORY:     Past Medical History:   Diagnosis Date    Acne rosacea     AI (aortic insufficiency)     mild    Arthritis     Chronic Insomnia     Colon polyps     Glaucoma suspect     HTN (hypertension)     MR (mitral regurgitation)     mild    MIGUEL (obstructive sleep apnea)     Osteopenia     Other ill-defined conditions(799.89)     bone spurs lower back and neck    PSVT [facility]ox[facility]ricular tachycardia) ([facility])     Stroke ([facility] [address]     tia    TIA (transient ischaemic attack)     amaurosis fugax 20+ years ago    Uterine fibroids        Past Surgical History:   Procedure Laterality Date    ENDOSCOPY, COLON, DIAGNOSTIC      HX APPENDECTOMY      HX GYN      fibroids removed    HX HEENT      tonsillectomy    HX ORTHOPAEDIC      closed reduction right leg    HX TONSIL AND ADENOIDECTOMY      VAS CAROTID DUPLEX BILATERAL  3/26/12    0-9% bilateral           MEDICATIONS:     Current Outpatient Medications   Medication Sig Dispense Refill    lisinopril (PRINIVIL, ZESTRIL) 5 mg tablet Take  by mouth daily.  zolpidem (AMBIEN) 10 mg tablet Take 5 mg by mouth nightly as needed for Sleep.  cholecalciferol (VITAMIN D3) 1,000 unit cap Take  by mouth daily.  bimatoprost (LUMIGAN) 0.01 % ophthalmic drops Administer 1 Drop to both eyes every evening.  timolol (TIMOPTIC) 0.25 % ophthalmic solution Administer 1 Drop to both eyes two (2) times a day.  cycloSPORINE (RESTASIS) 0.05 % ophthalmic emulsion Administer 1 Drop to both eyes two (2) times a day.       biotin 2,500 mcg tab Take  by mouth daily.  aspirin 81 mg chewable tablet Take 81 mg by mouth daily. Indications: 0.5 tab      magnesium 250 mg Tab Take 125 mg by mouth every other day.  CALCIUM CARBONATE (CALCIUM 600 PO) Take  by mouth nightly. No Known Allergies          SOCIAL HISTORY:     Social History     Tobacco Use    Smoking status: Never Smoker    Smokeless tobacco: Never Used   Substance Use Topics    Alcohol use: Yes     Alcohol/week: 3.5 oz     Types: 7 Glasses of wine per week     Comment: nightly    Drug use: No         FAMILY HISTORY:     Family History   Problem Relation Age of Onset    Heart Disease Father     Heart Disease Paternal Uncle               REVIEW OF SYMPTOMS:       Review of Symptoms:  Constitutional: Negative for fever, chills  HEENT: Negative for nosebleeds, tinnitus, and vision changes. Respiratory: Negative for cough, wheezing  Cardiovascular: Negative for orthopnea, claudication, syncope, and PND. Gastrointestinal: Negative for abdominal pain, diarrhea, melena. Genitourinary: Negative for dysuria  Musculoskeletal: Negative for myalgias. Skin: Negative for rash  Heme: No problems bleeding.  + bruises easily   Neurological: Negative for speech change and focal weakness.                       PHYSICAL EXAM:       Physical Exam:  Visit Vitals  /80   Pulse 60   Ht 5' 2\" (1.575 m)   Wt 101 lb (45.8 kg)   BMI 18.47 kg/m²          Patient appears generally well, mood and affect are appropriate and pleasant. HEENT:  Hearing intact, non-icteric, normocephalic, atraumatic. Neck Exam: Supple, No JVD  Lung Exam: Clear to auscultation, even breath sounds. Cardiac Exam: Regular rate and rhythm with 2/6 systolic murmur  Abdomen: Soft, non-tender  Extremities: Moves all ext well. No lower extremity edema.   Psych: Appropriate affect  Neuro - Grossly intact                  LABS / OTHER STUDIES:             Labs 5/18 - A1c 5.5, TSH 0.78, CBC OK, chol 208, , HDL 86, LDL 94, CMP OK                  CARDIAC DIAGNOSTICS:       Cardiac Evaluation Includes:  Echo 2/12 - LVEF 55%, mild-mod AI  Carotid Doppler 3/12 - 0-9% stenosis bilat   Echo 9/13 - LVEF 55-60%. [address] AI. Holter 9/13 - NSR, few PAC and PVC's  Echo 2/6/17 - LVEF 55-60%.  Grade 1 diastolic dysfunction.  Mild MR and TR.  AV sclerosis with mild-mod AI.        Echo 6/29/18 - LVEF 60 %. RV normal.  Mild MR. Mild AI. Mod TR. Echo 2/5/19 - LVEF 60%, normal diastology. Mild AI. Mild MR. Mild LAE. Mod TR.         EKG 1/26/17 - sinus bradycardia, PRWP  EKG 1/30/18 - sinus bradycardia, PRWP, non-specific T wave abn                   ASSESSMENT AND PLAN:       Assessment and Plan:  1) Heart racing spell / palpitations   - she notes some irregular heart beats that last for 10 min or so - will check a 3 day monitor - happens almost daily       2) Mild AI  - Echo 2/6/17 - LVEF 55-60%.  Grade 1 diastolic dysfunction.  Mild MR and TR.  AV sclerosis with mild-mod AI. - echo stable 6/18 and 2/5/19   - recheck an echo every 1-2 years       3) HTN  - she is taking a small dose of lisinopril only   - BP up and down but mostly OK      4) History of remote TIA  - cont ASA   - her prior lipids looked great (HDL 87)      5) See me back in 6 months (her preference). Patient expressed understanding of the plan - questions were answered.      I see her . She is in a walking group and walks regularly.  [address]lana Mittal MD, [address]  [address] [facility], [address]  [address], Renan Parker  [place], [facility] [address] [facility]  Ph: [phone]   Ph [phone]      [facility]   2/28/2019  Holter 2/1/219 - Oevrall normal study, PVC burden < 1%. Supraventricular ectopic buren was 5% (longest run was 40 beats at 116 bpm). No sustained runs of SVT.       Will call pt    ADDENDUM 3/4/2019  I called patient and gave her results. She says she did not have any prolonged HR running spells while wearing he monitor. She feels fine. Can check a monitor for longer if she has worsening complaints. She says she is walking and doing fine. ADDENDUM   3/25/2019  Records reviewed.    Labs 1/25/19 - chol 221, HDL 95, , TG 69, CMP OK, CBC OK

## 2019-02-06 LAB
ECHO AO ROOT DIAM: 2.98 CM
ECHO AV AREA PEAK VELOCITY: 1.6 CM2
ECHO AV AREA VTI: 1.5 CM2
ECHO AV AREA/BSA PEAK VELOCITY: 1.1 CM2/M2
ECHO AV AREA/BSA VTI: 1.1 CM2/M2
ECHO AV MEAN GRADIENT: 4 MMHG
ECHO AV PEAK GRADIENT: 7.7 MMHG
ECHO AV PEAK VELOCITY: 138.54 CM/S
ECHO AV REGURGITANT PHT: 637.3 CM
ECHO AV VTI: 30 CM
ECHO EST RA PRESSURE: 3 MMHG
ECHO LA AREA 4C: 17.3 CM2
ECHO LA MAJOR AXIS: 3.18 CM
ECHO LA TO AORTIC ROOT RATIO: 1.07
ECHO LA VOL 2C: 36.56 ML (ref 22–52)
ECHO LA VOL 4C: 47.55 ML (ref 22–52)
ECHO LA VOL BP: 45.22 ML (ref 22–52)
ECHO LA VOL/BSA BIPLANE: 31.63 ML/M2 (ref 16–28)
ECHO LA VOLUME INDEX A2C: 25.57 ML/M2 (ref 16–28)
ECHO LA VOLUME INDEX A4C: 33.26 ML/M2 (ref 16–28)
ECHO LV E' LATERAL VELOCITY: 4.98 CM/S
ECHO LV E' SEPTAL VELOCITY: 5.46 CM/S
ECHO LV EDV A2C: 56.7 ML
ECHO LV EDV A4C: 51 ML
ECHO LV EDV BP: 53.8 ML (ref 56–104)
ECHO LV EDV INDEX A4C: 35.7 ML/M2
ECHO LV EDV INDEX BP: 37.6 ML/M2
ECHO LV EDV NDEX A2C: 39.7 ML/M2
ECHO LV EJECTION FRACTION A2C: 57 %
ECHO LV EJECTION FRACTION A4C: 58 %
ECHO LV EJECTION FRACTION BIPLANE: 57.2 % (ref 55–100)
ECHO LV ESV A2C: 24.4 ML
ECHO LV ESV A4C: 21.3 ML
ECHO LV ESV BP: 23 ML (ref 19–49)
ECHO LV ESV INDEX A2C: 17.1 ML/M2
ECHO LV ESV INDEX A4C: 14.9 ML/M2
ECHO LV ESV INDEX BP: 16.1 ML/M2
ECHO LV GLOBAL LONGITUDINAL STRAIN (GLS): 19.4 %
ECHO LV INTERNAL DIMENSION DIASTOLIC: 4.32 CM (ref 3.9–5.3)
ECHO LV INTERNAL DIMENSION SYSTOLIC: 2.93 CM
ECHO LV IVSD: 0.68 CM (ref 0.6–0.9)
ECHO LV MASS 2D: 105.5 G (ref 67–162)
ECHO LV MASS INDEX 2D: 73.8 G/M2 (ref 43–95)
ECHO LV POSTERIOR WALL DIASTOLIC: 0.8 CM (ref 0.6–0.9)
ECHO LVOT DIAM: 1.88 CM
ECHO LVOT PEAK GRADIENT: 2.5 MMHG
ECHO LVOT PEAK VELOCITY: 78.44 CM/S
ECHO LVOT SV: 46.2 ML
ECHO LVOT VTI: 16.59 CM
ECHO MV A VELOCITY: 77.83 CM/S
ECHO MV AREA PHT: 4.5 CM2
ECHO MV E DECELERATION TIME (DT): 169 MS
ECHO MV E VELOCITY: 0.58 CM/S
ECHO MV E/A RATIO: 0.01
ECHO MV E/E' LATERAL: 0.12
ECHO MV E/E' RATIO (AVERAGED): 0.11
ECHO MV E/E' SEPTAL: 0.11
ECHO MV PRESSURE HALF TIME (PHT): 49 MS
ECHO PULMONARY ARTERY SYSTOLIC PRESSURE (PASP): 27.8 MMHG
ECHO RA AREA 4C: 17.65 CM2
ECHO RIGHT VENTRICULAR SYSTOLIC PRESSURE (RVSP): 27.8 MMHG
ECHO RV INTERNAL DIMENSION: 3.6 CM
ECHO RV TAPSE: 1.97 CM (ref 1.5–2)
ECHO TV REGURGITANT MAX VELOCITY: 249 CM/S
ECHO TV REGURGITANT PEAK GRADIENT: 24.8 MMHG
PISA AR MAX VEL: 357.89 CM/S

## 2019-08-13 ENCOUNTER — OFFICE VISIT (OUTPATIENT)
Dept: CARDIOLOGY CLINIC | Age: 84
End: 2019-08-13

## 2019-08-13 VITALS
BODY MASS INDEX: 18.58 KG/M2 | SYSTOLIC BLOOD PRESSURE: 148 MMHG | HEIGHT: 62 IN | HEART RATE: 56 BPM | RESPIRATION RATE: 16 BRPM | DIASTOLIC BLOOD PRESSURE: 78 MMHG | WEIGHT: 101 LBS

## 2019-08-13 DIAGNOSIS — I10 ESSENTIAL HYPERTENSION, BENIGN: ICD-10-CM

## 2019-08-13 DIAGNOSIS — R00.0 RAPID HEART BEAT: ICD-10-CM

## 2019-08-13 DIAGNOSIS — R00.2 PALPITATIONS: Primary | ICD-10-CM

## 2019-08-13 RX ORDER — ASPIRIN 81 MG/1
TABLET ORAL
COMMUNITY

## 2019-08-13 NOTE — PROGRESS NOTES
Jefferson Johnson MD. [facility]              Patient: Kenny Duran  : 1935      Today's Date: 2019          HISTORY OF PRESENT ILLNESS:     History of Present Illness:  She is here for follow-up. She is doing fine. She does feel palpitations some times - happens infrequently - once a month - lasts 20 min - lies down and feels better. No CP. No sig dizziness. PAST MEDICAL HISTORY:     Past Medical History:   Diagnosis Date    Acne rosacea     AI (aortic insufficiency)     mild    Arthritis     Chronic Insomnia     Colon polyps     Glaucoma suspect     HTN (hypertension)     MR (mitral regurgitation)     mild    MIGUEL (obstructive sleep apnea)     Osteopenia     Other ill-defined conditions(799.89)     bone spurs lower back and neck    [facility] tachycardia) ([facility])     Stroke ([facility] [address]     tia    TIA (transient ischaemic attack)     amaurosis fugax 20+ years ago    Uterine fibroids        Past Surgical History:   Procedure Laterality Date    ENDOSCOPY, COLON, DIAGNOSTIC      HX APPENDECTOMY      HX GYN      fibroids removed    HX HEENT      tonsillectomy    HX ORTHOPAEDIC      closed reduction right leg    HX TONSIL AND ADENOIDECTOMY      VAS CAROTID DUPLEX BILATERAL  3/26/12    0-9% bilateral         MEDICATIONS:     Current Outpatient Medications   Medication Sig Dispense Refill    aspirin delayed-release 81 mg tablet Take 1/2 tablet daily.  lisinopril (PRINIVIL, ZESTRIL) 5 mg tablet Take 2.5 mg by mouth daily.  zolpidem (AMBIEN) 10 mg tablet Take 10 mg by mouth nightly as needed for Sleep.  cholecalciferol (VITAMIN D3) 1,000 unit cap Take  by mouth daily.  bimatoprost (LUMIGAN) 0.01 % ophthalmic drops Administer 1 Drop to both eyes every evening.  timolol (TIMOPTIC) 0.25 % ophthalmic solution Administer 1 Drop to both eyes two (2) times a day.       cycloSPORINE (RESTASIS) 0.05 % ophthalmic emulsion Administer 1 Drop to both eyes two (2) times a day.  biotin 2,500 mcg tab Take  by mouth daily.  CALCIUM CARBONATE (CALCIUM 600 PO) Take  by mouth nightly.  magnesium 250 mg Tab Take 125 mg by mouth every other day. No Known Allergies        SOCIAL HISTORY:     Social History     Tobacco Use    Smoking status: Never Smoker    Smokeless tobacco: Never Used   Substance Use Topics    Alcohol use: Yes     Alcohol/week: 5.8 standard drinks     Types: 7 Glasses of wine per week     Comment: nightly    Drug use: No     Types: Prescription         FAMILY HISTORY:     Family History   Problem Relation Age of Onset    Heart Disease Father     Heart Disease Paternal Uncle             REVIEW OF SYMPTOMS:       Review of Symptoms:  Constitutional: Negative for fever, chills  HEENT: Negative for nosebleeds, tinnitus, and vision changes. Respiratory: Negative for cough, wheezing  Cardiovascular: Negative for orthopnea, claudication, syncope, and PND. Gastrointestinal: Negative for abdominal pain, diarrhea, melena. Genitourinary: Negative for dysuria  Musculoskeletal: Negative for myalgias. Skin: Negative for rash  Heme: No problems bleeding.  + bruises easily   Neurological: Negative for speech change and focal weakness.                       PHYSICAL EXAM:       Physical Exam:  Visit Vitals  /78 (BP 1 Location: Left arm)   Pulse (!) 56   Resp 16   Ht 5' 2\" (1.575 m)   Wt 101 lb (45.8 kg)   BMI 18.47 kg/m²          Patient appears generally well, mood and affect are appropriate and pleasant. HEENT:  Hearing intact, non-icteric, normocephalic, atraumatic. Neck Exam: Supple, No JVD  Lung Exam: Clear to auscultation, even breath sounds. Cardiac Exam: Regular rate and rhythm KRHN 4/5 systolic murmur  Abdomen: Soft, non-tender  Extremities: Moves all ext well. No lower extremity edema.   Psych: Appropriate affect  Neuro - Grossly intact                  LABS / OTHER STUDIES:           Labs 5/18 - A1c 5.5, TSH 0.78, CBC OK, chol 208, , HDL 86, LDL 94, CMP OK        Labs 1/25/19 - chol 221, HDL 95, , TG 69, CMP OK, CBC OK              CARDIAC DIAGNOSTICS:       Cardiac Evaluation Includes:  Echo 2/12 - LVEF 55%, mild-mod AI  Carotid Doppler 3/12 - 0-9% stenosis bilat   Echo 9/13 - LVEF 55-60%. [address] AI. Holter 9/13 - NSR, few PAC and PVC's  Echo 2/6/17 - LVEF 55-60%.  Grade 1 diastolic dysfunction.  Mild MR and TR.  AV sclerosis with mild-mod AI.        Echo 6/29/18 - LVEF 60 %. RV normal.  Mild MR.  Mild AI.  Mod TR.      Echo 2/5/19 - LVEF 60%, normal diastology. Mild AI. Mild MR. Mild LAE. Mod TR.      Holter 2/1/219 - Oevrall normal study, PVC burden < 1%. Supraventricular ectopic buren was 5% (longest run was 40 beats at 116 bpm).         EKG 1/26/17 - sinus bradycardia, PRWP  EKG 1/30/18 - sinus bradycardia, PRWP, non-specific T wave abn   EKG 8/13/19 - sinus ginger, PRWP, low voltage                   ASSESSMENT AND PLAN:       Assessment and Plan:  1) Infrequent Heart racing spell / palpitations   - she notes some irregular heart beats that last for 10-20 min or so happening once every month or so   - Holter 2/1/219 - Oevrall normal study, PVC burden < 1%. Supraventricular ectopic buren was 5% (longest run was 40 beats at 116 bpm). - If she has worsening complaints, can consider an event monitor (she prefers to wait on that)   - Overall she is doing fine. 2) Mild AI  - Echo 2/6/17 - LVEF 55-60%.  Grade 1 diastolic dysfunction.  Mild MR and TR.  AV sclerosis with mild-mod AI.    - echo stable 6/18 and 2/5/19   - recheck an echo in 2 years       3) HTN  - she is taking a small dose of lisinopril only   - BP usually OK --> follow BP at home       4) History of remote TIA  - cont ASA   - her prior lipids looked great (HDL 87)      5) See me back in 6 months (her preference).  Patient expressed understanding of the plan - questions were answered.      I see her . Bentley Up is in a walking group and walks regularly.            Guy Wilson MD, [address], [facility] [address]  Ph: [phone]                               Ph [phone]

## 2019-08-13 NOTE — PROGRESS NOTES
Visit Vitals  /78 (BP 1 Location: Left arm)   Pulse (!) 56   Resp 16   Ht 5' 2\" (1.575 m)   Wt 101 lb (45.8 kg)   BMI 18.47 kg/m²       Patient is here for follow up. Denies chest pain, SOB or swelling. C/o intermittent palpitations.